# Patient Record
Sex: MALE | Race: OTHER | Employment: FULL TIME | URBAN - METROPOLITAN AREA
[De-identification: names, ages, dates, MRNs, and addresses within clinical notes are randomized per-mention and may not be internally consistent; named-entity substitution may affect disease eponyms.]

---

## 2021-07-11 ENCOUNTER — HOSPITAL ENCOUNTER (EMERGENCY)
Age: 41
Discharge: LEFT BEFORE DISCHARGE | End: 2021-07-11
Attending: EMERGENCY MEDICINE

## 2021-07-11 VITALS
DIASTOLIC BLOOD PRESSURE: 88 MMHG | OXYGEN SATURATION: 99 % | HEART RATE: 84 BPM | RESPIRATION RATE: 16 BRPM | SYSTOLIC BLOOD PRESSURE: 134 MMHG | TEMPERATURE: 98.4 F

## 2021-07-11 DIAGNOSIS — M25.532 BILATERAL WRIST PAIN: Primary | ICD-10-CM

## 2021-07-11 DIAGNOSIS — M25.531 BILATERAL WRIST PAIN: Primary | ICD-10-CM

## 2021-07-11 PROCEDURE — 99283 EMERGENCY DEPT VISIT LOW MDM: CPT

## 2021-07-11 RX ORDER — PREDNISONE 20 MG/1
40 TABLET ORAL DAILY
Qty: 10 TABLET | Refills: 0 | Status: SHIPPED | OUTPATIENT
Start: 2021-07-11 | End: 2021-07-16

## 2021-07-11 ASSESSMENT — PAIN SCALES - GENERAL: PAINLEVEL_OUTOF10: 8

## 2021-07-11 NOTE — ED PROVIDER NOTES
[address] [facility]        Pt Name: Nydia Morocho  MRN: [id number]  [place] 1980  Date of evaluation: 7/11/2021  Provider: Shari Bee PA-C  PCP: No primary care provider on file. Note Started: 12:31 PM EDT        I have seen and evaluated this patient with my supervising physician Bobby Ovalle, *. CHIEF COMPLAINT       Chief Complaint   Patient presents with    Arm Pain     chronic bilateral arm pain       HISTORY OF PRESENT ILLNESS   (Location, Timing/Onset, Context/Setting, Quality, Duration, Modifying Factors, Severity, Associated Signs and Symptoms)  Note limiting factors. Chief Complaint: Bilateral wrist pain    Nydia Morocho is a 41 y.o. male who presents to the emergency department with a chief complaint of bilateral wrist pain. Patient states that it got worse over the past 2 days. He states he is from [facility] scheduled for surgery in the next 2 to 3 weeks. States he has been taking gabapentin for the pain with minimal relief along with over-the-counter ibuprofen. States he is taken prednisone and Norco in the past from his doctors in [place] but is currently here from work and unable to get these prescriptions filled. Denies any recent injury or trauma, color change, numbness, decreased range of motion, nausea, vomiting, fevers. Patient states he got tingling going into his first through third fingers. There was an email sent to provider team from provider at [facility] on 6/2 concerning a patient that matches his description stating that he was going to multiple different emergency departments giving different names stating that he was from [place] scheduled for surgery in a couple weeks before he goes home. This was 5 weeks ago. Patient mashes this description. Patient states that he forgot his wallet at registration so he does not have ID so he can check his ID.   When he was confronted about this patient had no further concerns but then apparently eloped prior to his steroid prescription be given to him. Nursing Notes were all reviewed and agreed with or any disagreements were addressed in the HPI. REVIEW OF SYSTEMS    (2-9 systems for level 4, 10 or more for level 5)     Review of Systems    Positives and Pertinent negatives as per HPI. Except as noted above in the ROS, all other systems were reviewed and negative. PAST MEDICAL HISTORY   History reviewed. No pertinent past medical history. SURGICAL HISTORY   History reviewed. No pertinent surgical history. Zeus Velasquez 89       Discharge Medication List as of 7/11/2021  1:44 PM            ALLERGIES     Patient has no known allergies. FAMILYHISTORY     History reviewed. No pertinent family history. SOCIAL HISTORY       Social History     Tobacco Use    Smoking status: Never Smoker    Smokeless tobacco: Never Used   Substance Use Topics    Alcohol use: Never    Drug use: Never       SCREENINGS             PHYSICAL EXAM    (up to 7 for level 4, 8 or more for level 5)     ED Triage Vitals [07/11/21 1211]   BP Temp Temp src Pulse Resp SpO2 Height Weight   134/88 98.4 °F (36.9 °C) -- 84 16 99 % -- --       Physical Exam  Vitals and nursing note reviewed. Constitutional:       Appearance: He is well-developed. He is not diaphoretic. HENT:      Head: Atraumatic. Nose: Nose normal.   Eyes:      General:         Right eye: No discharge. Left eye: No discharge. Cardiovascular:      Rate and Rhythm: Normal rate and regular rhythm. Pulses: Normal pulses. Heart sounds: Normal heart sounds. No murmur heard. No gallop. Comments: 2+ radial pulse bilaterally  Pulmonary:      Effort: Pulmonary effort is normal.      Breath sounds: Normal breath sounds. No stridor. No wheezing, rhonchi or rales. Musculoskeletal:         General: No swelling or deformity. Normal range of motion. Cervical back: Normal range of motion. Comments: Full range of motion of bilateral hands, wrist and shoulders. No joint warmth, erythema or rash. No bony deformity. Skin:     General: Skin is warm and dry. Findings: No erythema or rash. Neurological:      Mental Status: He is alert and oriented to person, place, and time. Cranial Nerves: No cranial nerve deficit. Psychiatric:         Behavior: Behavior normal.         DIAGNOSTIC RESULTS   LABS:    Labs Reviewed - No data to display    When ordered only abnormal lab results are displayed. All other labs were within normal range or not returned as of this dictation. EKG: When ordered, EKG's are interpreted by the Emergency Department Physician in the absence of a cardiologist.  Please see their note for interpretation of EKG. RADIOLOGY:   Non-plain film images such as CT, Ultrasound and MRI are read by the radiologist. Plain radiographic images are visualized and preliminarily interpreted by the ED Provider with the below findings:        Interpretation per the Radiologist below, if available at the time of this note:    No orders to display     No results found. PROCEDURES   Unless otherwise noted below, none     Procedures    CRITICAL CARE TIME   N/A    CONSULTS:  None      EMERGENCY DEPARTMENT COURSE and DIFFERENTIAL DIAGNOSIS/MDM:   Vitals:    Vitals:    07/11/21 1211   BP: 134/88   Pulse: 84   Resp: 16   Temp: 98.4 °F (36.9 °C)   SpO2: 99%       Patient was given the following medications:  Medications - No data to display        Patient present with some bilateral wrist pain. States he has history of carpal tunnel. There have been emails going around about concern for malingering with this patient. We confronted him about this and he stated he needed to go get his insurance card and then never came back. There is no sign of DVT, compartment syndrome, arterial occlusion or other emergent etiology.   Do not believe x-ray imaging or further work-up or testing is warranted at this time. Concern is for drug-seeking behavior in this patient. FINAL IMPRESSION      1.  Bilateral wrist pain          DISPOSITION/PLAN   DISPOSITION Eloped - Left Before Treatment Complete 07/11/2021 01:35:50 PM      PATIENT REFERRED TO:  [facility] [place] [facility] Emergency Department  [address] [place] [address]  [phone]    As needed      DISCHARGE MEDICATIONS:  Discharge Medication List as of 7/11/2021  1:44 PM      START taking these medications    Details   predniSONE (DELTASONE) 20 MG tablet Take 2 tablets by mouth daily for 5 days, Disp-10 tablet, R-0Print             DISCONTINUED MEDICATIONS:  Discharge Medication List as of 7/11/2021  1:44 PM                 (Please note that portions of this note were completed with a voice recognition program.  Efforts were made to edit the dictations but occasionally words are mis-transcribed.)    Dalton Zhang PA-C (electronically signed)           Dalton Zhang PA-C  07/11/21 1995

## 2021-07-11 NOTE — ED PROVIDER NOTES
[facility] [place] [facility] Emergency Department      Pt Name: Angela Nuñez  MRN: [id number]  [place] 1980  Date of evaluation: 7/11/2021  Provider: Hillary Conde MD  I independently performed a history and physical on Angela Nuñez. All diagnostic, treatment, and disposition decisions were made by myself in conjunction with the advanced practice provider. HPI: Angela Nuñez presented with   Chief Complaint   Patient presents with    Arm Pain     chronic bilateral arm pain     Angela Nuñez has no past medical history on file. He has no past surgical history on file. No current facility-administered medications on file prior to encounter. No current outpatient medications on file prior to encounter. PHYSICAL EXAM  Vitals: /88   Pulse 84   Temp 98.4 °F (36.9 °C)   Resp 16   SpO2 99%   Constitutional:  41 y.o. male alert  HENT:  Atraumatic, oral mucosa moist  Neck:  No visible JVD, supple  Chest/Lungs:  Respiratory effort normal  Abdomen:  Non-distended  Back:  No gross deformity  Extremities:  Normal tone and perfusion, moves both arms in a natural manner, does shake his hands intermittently in the air, no swelling, light touch sensation is intact, capillary refill is normal, radial pulses normal, no edema appreciated    Medical Decision Making and Plan: Briefly, this is an 41 y.o.male who presented with complaint of wrist pain from CTS. his clinical exam is benign. Based on his description, we had some concerns that he has been seen at other ED's in [facility] with similar complaints and request for narcotic medication. He could not provide identification to verify his name and address. I informed him that we can provide steroid he is requesting but not the narcotic. We had planned on giving him prescription for the steroid but he eloped the department without waiting for official discharge instructions.     For further details of [facility] Emergency Department encounter, please see documentation by advanced practice provider Herber Washington.     FOLLOW UP:    [facility] Emergency Department  [address] [facility]  [phone]    As needed    FINAL IMPRESSION:    1.  Bilateral wrist pain       DISPOSITION Eloped - Left Before Treatment Complete 07/11/2021 01:35:50 PM       Charlene Mitcheal Brittle, MD  07/11/21 5753